# Patient Record
Sex: MALE | Race: WHITE | HISPANIC OR LATINO
[De-identification: names, ages, dates, MRNs, and addresses within clinical notes are randomized per-mention and may not be internally consistent; named-entity substitution may affect disease eponyms.]

---

## 2022-08-01 PROBLEM — Z00.00 ENCOUNTER FOR PREVENTIVE HEALTH EXAMINATION: Status: ACTIVE | Noted: 2022-08-01

## 2022-10-04 ENCOUNTER — APPOINTMENT (OUTPATIENT)
Dept: PLASTIC SURGERY | Facility: CLINIC | Age: 27
End: 2022-10-04

## 2022-10-04 VITALS — HEIGHT: 72 IN | WEIGHT: 220 LBS | BODY MASS INDEX: 29.8 KG/M2

## 2022-10-04 PROCEDURE — 99203 OFFICE O/P NEW LOW 30 MIN: CPT

## 2022-10-04 NOTE — HISTORY OF PRESENT ILLNESS
[FreeTextEntry1] : 27 yr old male\par \par RHD\par \par daily marijuana denies cigarettes\par nondiabetic\par \par RHD\par \par left wrist dorsal ganglion s/p one aspiration by Agustin several years ago

## 2022-10-04 NOTE — ASSESSMENT
[FreeTextEntry1] : discussed managemtn fo dorsal wrist ganlgia\par \par if becomes symptomatic will get xray and re-evaluate for aspiration or excision\par \par Regarding the hand surgery, we discussed the risk of bleeding, infection, need for additional unplanned surgery, need for postoperative hand occupational therapy, possible lack of improvement and diminished hand function.  We discussed prolonged recovery.  All questions were answered and all risks were well understood by the patient.\par \par Due to COVID 19, pre-visit patient instructions were explained to the patient and their symptoms were checked upon arrival.  \par Masks were used by the health care providers and staff and the examination room was cleaned after the patient visit was completed.\par

## 2024-03-25 ENCOUNTER — APPOINTMENT (OUTPATIENT)
Dept: ORTHOPEDIC SURGERY | Facility: CLINIC | Age: 29
End: 2024-03-25
Payer: COMMERCIAL

## 2024-03-25 VITALS — HEIGHT: 72 IN | BODY MASS INDEX: 29.8 KG/M2 | WEIGHT: 220 LBS

## 2024-03-25 DIAGNOSIS — S39.012A STRAIN OF MUSCLE, FASCIA AND TENDON OF LOWER BACK, INITIAL ENCOUNTER: ICD-10-CM

## 2024-03-25 PROCEDURE — 99203 OFFICE O/P NEW LOW 30 MIN: CPT

## 2024-03-25 PROCEDURE — 72100 X-RAY EXAM L-S SPINE 2/3 VWS: CPT

## 2024-03-25 RX ORDER — TIZANIDINE 4 MG/1
4 TABLET ORAL
Qty: 14 | Refills: 0 | Status: ACTIVE | COMMUNITY
Start: 2024-03-25 | End: 1900-01-01

## 2024-03-25 RX ORDER — MELOXICAM 15 MG/1
15 TABLET ORAL
Qty: 30 | Refills: 0 | Status: ACTIVE | COMMUNITY
Start: 2024-03-25 | End: 1900-01-01

## 2024-03-26 NOTE — DATA REVIEWED
[FreeTextEntry1] : X-ray images were obtained at the office today.  AP, lateral, flexion, extension views of the lumbar spine reveal no acute fractures, dislocations, bony abnormalities

## 2024-03-26 NOTE — DISCUSSION/SUMMARY
[de-identified] : Patient has a strain of the lumbar spine.  At this time, I recommend physical therapy to work on strengthening, stretching, range of motion, apply heat to the area and any topical cream, I am also prescribing meloxicam 15 mg to take once a day in the morning with breakfast and tizanidine 4 mg to take at night before bed as this can make him drowsy and I recommend not driving on the tizanidine.  Patient will rest from all activities for at least 2 weeks.  Patient will follow-up in approximate 4 to 6 weeks if patient having pain further imaging may be warranted.  Patient agrees above plan all questions were answered today

## 2024-03-26 NOTE — HISTORY OF PRESENT ILLNESS
[de-identified] : 28-year-old male presents for lower back pain.  Earlier today, patient was dead lifting and as he was pulling up he had a sharp pain in his lower back.  Since then he has had extreme discomfort with any sitting down and laying down or resting.  Patient feels better standing up.  Denies any past injuries or surgeries to the back.  Has been taking anti-inflammatories and applying topical creams for pain relief.  Denies any pain or numbness and tingling down bilateral lower extremities or groin.

## 2024-03-26 NOTE — PHYSICAL EXAM
[de-identified] : Physical exam of the lumbar spine: -No erythema, edema, ecchymosis present.  Skin intact -TTP over paraspinal muscles lumbar region. No TTP lumbar, cervical, thoracic spine -No tenderness to palpation of the bilateral lower extremities -Patient has full strength of bilateral lower extremities, sensation intact to light touch

## 2024-04-26 ENCOUNTER — APPOINTMENT (OUTPATIENT)
Dept: ORTHOPEDIC SURGERY | Facility: CLINIC | Age: 29
End: 2024-04-26